# Patient Record
Sex: FEMALE | Race: WHITE | NOT HISPANIC OR LATINO | Employment: FULL TIME | RURAL
[De-identification: names, ages, dates, MRNs, and addresses within clinical notes are randomized per-mention and may not be internally consistent; named-entity substitution may affect disease eponyms.]

---

## 2022-01-01 ENCOUNTER — TELEMEDICINE (OUTPATIENT)
Dept: FAMILY MEDICINE CLINIC | Facility: CLINIC | Age: 57
End: 2022-01-01

## 2022-01-01 ENCOUNTER — OFFICE VISIT (OUTPATIENT)
Dept: GASTROENTEROLOGY | Facility: CLINIC | Age: 57
End: 2022-01-01

## 2022-01-01 ENCOUNTER — HOSPITAL ENCOUNTER (EMERGENCY)
Facility: HOSPITAL | Age: 57
End: 2022-11-03
Attending: EMERGENCY MEDICINE | Admitting: EMERGENCY MEDICINE

## 2022-01-01 ENCOUNTER — APPOINTMENT (OUTPATIENT)
Dept: GENERAL RADIOLOGY | Facility: HOSPITAL | Age: 57
End: 2022-01-01

## 2022-01-01 ENCOUNTER — TELEPHONE (OUTPATIENT)
Dept: FAMILY MEDICINE CLINIC | Facility: CLINIC | Age: 57
End: 2022-01-01

## 2022-01-01 VITALS
HEART RATE: 138 BPM | HEIGHT: 64 IN | RESPIRATION RATE: 12 BRPM | SYSTOLIC BLOOD PRESSURE: 134 MMHG | BODY MASS INDEX: 30.73 KG/M2 | WEIGHT: 180 LBS | DIASTOLIC BLOOD PRESSURE: 75 MMHG | OXYGEN SATURATION: 76 %

## 2022-01-01 DIAGNOSIS — R09.02 HYPOXIA: ICD-10-CM

## 2022-01-01 DIAGNOSIS — R09.2 RESPIRATORY ARREST: Primary | ICD-10-CM

## 2022-01-01 DIAGNOSIS — R19.5 LOOSE STOOLS: ICD-10-CM

## 2022-01-01 DIAGNOSIS — K21.9 GASTROESOPHAGEAL REFLUX DISEASE, UNSPECIFIED WHETHER ESOPHAGITIS PRESENT: Primary | ICD-10-CM

## 2022-01-01 DIAGNOSIS — R11.0 NAUSEA WITHOUT VOMITING: ICD-10-CM

## 2022-01-01 DIAGNOSIS — R11.0 NAUSEA: ICD-10-CM

## 2022-01-01 DIAGNOSIS — R19.7 DIARRHEA, UNSPECIFIED TYPE: Primary | ICD-10-CM

## 2022-01-01 LAB
BUN BLDA-MCNC: 27 MG/DL (ref 8–26)
CA-I BLDA-SCNC: 1 MMOL/L (ref 1.2–1.32)
CHLORIDE BLDA-SCNC: 100 MMOL/L (ref 98–109)
CO2 BLDA-SCNC: 15 MMOL/L (ref 24–29)
CREAT BLDA-MCNC: 1.3 MG/DL (ref 0.6–1.3)
EGFRCR SERPLBLD CKD-EPI 2021: 48.1 ML/MIN/1.73
GLUCOSE BLDC GLUCOMTR-MCNC: <20 MG/DL (ref 70–130)
HCT VFR BLDA CALC: 50 % (ref 38–51)
HGB BLDA-MCNC: 17 G/DL (ref 12–17)
POTASSIUM BLDA-SCNC: 4 MMOL/L (ref 3.5–4.9)
SODIUM BLD-SCNC: 136 MMOL/L (ref 138–146)

## 2022-01-01 PROCEDURE — 99284 EMERGENCY DEPT VISIT MOD MDM: CPT

## 2022-01-01 PROCEDURE — 85014 HEMATOCRIT: CPT

## 2022-01-01 PROCEDURE — 99213 OFFICE O/P EST LOW 20 MIN: CPT | Performed by: NURSE PRACTITIONER

## 2022-01-01 PROCEDURE — 25010000002 ATROPINE PER 0.01 MG

## 2022-01-01 PROCEDURE — 92950 HEART/LUNG RESUSCITATION CPR: CPT

## 2022-01-01 PROCEDURE — 80047 BASIC METABLC PNL IONIZED CA: CPT

## 2022-01-01 PROCEDURE — 31500 INSERT EMERGENCY AIRWAY: CPT

## 2022-01-01 PROCEDURE — 99214 OFFICE O/P EST MOD 30 MIN: CPT | Performed by: PHYSICIAN ASSISTANT

## 2022-01-01 PROCEDURE — 25010000002 EPINEPHRINE 1 MG/10ML SOLUTION PREFILLED SYRINGE: Performed by: EMERGENCY MEDICINE

## 2022-01-01 PROCEDURE — 36415 COLL VENOUS BLD VENIPUNCTURE: CPT

## 2022-01-01 PROCEDURE — 25010000002 ALTEPLASE PER 1 MG: Performed by: EMERGENCY MEDICINE

## 2022-01-01 PROCEDURE — 25010000002 AMIODARONE PER 30 MG: Performed by: EMERGENCY MEDICINE

## 2022-01-01 RX ORDER — LISINOPRIL 20 MG/1
20 TABLET ORAL DAILY
Qty: 30 TABLET | Refills: 0 | Status: SHIPPED | OUTPATIENT
Start: 2022-01-01

## 2022-01-01 RX ORDER — LISINOPRIL 20 MG/1
20 TABLET ORAL DAILY
Qty: 30 TABLET | Refills: 0 | Status: SHIPPED | OUTPATIENT
Start: 2022-01-01 | End: 2022-01-01

## 2022-01-01 RX ORDER — QUETIAPINE FUMARATE 25 MG/1
TABLET, FILM COATED ORAL
Qty: 60 TABLET | Refills: 2 | Status: SHIPPED | OUTPATIENT
Start: 2022-01-01

## 2022-01-01 RX ORDER — PANTOPRAZOLE SODIUM 40 MG/1
TABLET, DELAYED RELEASE ORAL
Qty: 30 TABLET | Refills: 1 | Status: SHIPPED | OUTPATIENT
Start: 2022-01-01 | End: 2022-01-01

## 2022-01-01 RX ORDER — PANTOPRAZOLE SODIUM 40 MG/1
TABLET, DELAYED RELEASE ORAL
Qty: 30 TABLET | Refills: 1 | Status: SHIPPED | OUTPATIENT
Start: 2022-01-01

## 2022-01-01 RX ORDER — LISINOPRIL 20 MG/1
20 TABLET ORAL DAILY
COMMUNITY
Start: 2022-01-01 | End: 2022-01-01

## 2022-01-01 RX ORDER — CITALOPRAM 40 MG/1
40 TABLET ORAL DAILY
Qty: 30 TABLET | Refills: 0 | Status: SHIPPED | OUTPATIENT
Start: 2022-01-01 | End: 2022-01-01

## 2022-01-01 RX ORDER — QUETIAPINE FUMARATE 25 MG/1
TABLET, FILM COATED ORAL
Qty: 60 TABLET | Refills: 2 | OUTPATIENT
Start: 2022-01-01

## 2022-01-01 RX ORDER — AMIODARONE HYDROCHLORIDE 50 MG/ML
INJECTION, SOLUTION INTRAVENOUS
Status: COMPLETED | OUTPATIENT
Start: 2022-01-01 | End: 2022-01-01

## 2022-01-01 RX ORDER — SODIUM CHLORIDE 0.9 % (FLUSH) 0.9 %
10 SYRINGE (ML) INJECTION AS NEEDED
Status: DISCONTINUED | OUTPATIENT
Start: 2022-01-01 | End: 2022-01-01

## 2022-01-01 RX ORDER — DEXTROSE MONOHYDRATE 25 G/50ML
INJECTION, SOLUTION INTRAVENOUS
Status: COMPLETED | OUTPATIENT
Start: 2022-01-01 | End: 2022-01-01

## 2022-01-01 RX ORDER — ATROPINE SULFATE 0.1 MG/ML
INJECTION INTRAVENOUS
Status: COMPLETED | OUTPATIENT
Start: 2022-01-01 | End: 2022-01-01

## 2022-01-01 RX ORDER — CITALOPRAM 40 MG/1
TABLET ORAL
Qty: 30 TABLET | Refills: 5 | Status: SHIPPED | OUTPATIENT
Start: 2022-01-01

## 2022-01-01 RX ORDER — ONDANSETRON HYDROCHLORIDE 8 MG/1
8 TABLET, FILM COATED ORAL EVERY 8 HOURS PRN
Qty: 30 TABLET | Refills: 2 | Status: SHIPPED | OUTPATIENT
Start: 2022-01-01

## 2022-01-01 RX ORDER — EPINEPHRINE 0.1 MG/ML
SYRINGE (ML) INJECTION
Status: COMPLETED | OUTPATIENT
Start: 2022-01-01 | End: 2022-01-01

## 2022-01-01 RX ADMIN — EPINEPHRINE 1 MG: 0.1 INJECTION INTRACARDIAC; INTRAVENOUS at 12:30

## 2022-01-01 RX ADMIN — EPINEPHRINE 1 MG: 0.1 INJECTION INTRACARDIAC; INTRAVENOUS at 12:12

## 2022-01-01 RX ADMIN — SODIUM BICARBONATE 50 MEQ: 84 INJECTION, SOLUTION INTRAVENOUS at 12:08

## 2022-01-01 RX ADMIN — AMIODARONE HYDROCHLORIDE 300 MG: 50 INJECTION, SOLUTION INTRAVENOUS at 12:19

## 2022-01-01 RX ADMIN — EPINEPHRINE 1 MG: 0.1 INJECTION INTRACARDIAC; INTRAVENOUS at 12:41

## 2022-01-01 RX ADMIN — EPINEPHRINE 1 MG: 0.1 INJECTION INTRACARDIAC; INTRAVENOUS at 12:08

## 2022-01-01 RX ADMIN — SODIUM BICARBONATE 50 MEQ: 84 INJECTION, SOLUTION INTRAVENOUS at 11:54

## 2022-01-01 RX ADMIN — DEXTROSE MONOHYDRATE 25 G: 25 INJECTION, SOLUTION INTRAVENOUS at 12:29

## 2022-01-01 RX ADMIN — EPINEPHRINE 1 MG: 0.1 INJECTION INTRACARDIAC; INTRAVENOUS at 12:05

## 2022-01-01 RX ADMIN — ALTEPLASE 100 MG: KIT at 12:30

## 2022-01-01 RX ADMIN — EPINEPHRINE 1 MG: 0.1 INJECTION INTRACARDIAC; INTRAVENOUS at 12:25

## 2022-01-01 RX ADMIN — EPINEPHRINE 1 MG: 0.1 INJECTION INTRACARDIAC; INTRAVENOUS at 11:52

## 2022-01-01 RX ADMIN — EPINEPHRINE 1 MG: 0.1 INJECTION INTRACARDIAC; INTRAVENOUS at 12:16

## 2022-01-01 RX ADMIN — EPINEPHRINE 1 MG: 0.1 INJECTION INTRACARDIAC; INTRAVENOUS at 12:37

## 2022-01-01 RX ADMIN — ATROPINE SULFATE 1 MG: 0.1 INJECTION INTRAVENOUS at 11:57

## 2022-05-11 NOTE — TELEPHONE ENCOUNTER
Gal ordered bw on 03/03/22, pt never had done. Last labs were 03/17/21. Protocol not med. Last appt was with CG on 03/11/22. Last RF was 03/11/22 30 w/ 1RF.

## 2022-06-22 NOTE — TELEPHONE ENCOUNTER
Incoming Refill Request      Medication requested (name and dose):   [facility]     Pharmacy where request should be sent: [facility]    Additional details provided by patient: PT CALLED TO SCHEDULE APPT. SHE STOPPED TAKING THIS MEDICATION WHEN SHE GOT SICK WITH COVID, WANTS TO RESTART TAKING MED D/T CRYING AND DEPRESSION EPISODES. WOULD LIKE TO HAVE ENOUGH RX TO GET HER THROUGH TO APPT SENT TO [facility].     Best call back number: [phone]    Does the patient have less than a 3 day supply:  [x] Yes  [] No    Sophia COLLADO Rep  06/22/22, 13:56 EDT

## 2022-07-05 NOTE — PROGRESS NOTES
Chief Complaint  Med Refill (zofran) and Diarrhea (W/ vomiting)  This was an audio and video enabled telemedicine encounter.  Subjective          Rubia Muniz presents to [facility]  Pt has had ongoing GI issues with nausea and diarrhea. She notices certain foods cause symptoms to worsen. She denies melena or abdominal pain. She takes Protonix as directed. She has used Zofran in the past.       Objective   Vital Signs:   There were no vitals taken for this visit.    There is no height or weight on file to calculate BMI.    Review of Systems   Constitutional: Negative for chills and fever.   Gastrointestinal: Positive for diarrhea, nausea and vomiting. Negative for abdominal pain.          Current Outpatient Medications:   •  citalopram (CeleXA) 40 MG tablet, Take 1 tablet by mouth Daily., Disp: 30 tablet, Rfl: 0  •  lisinopril (PRINIVIL,ZESTRIL) 20 MG tablet, Take 20 mg by mouth Daily., Disp: , Rfl:   •  pantoprazole (PROTONIX) 40 MG EC tablet, TAKE ONE TABLET BY MOUTH EVERY DAY, Disp: 30 tablet, Rfl: 1  •  QUEtiapine (SEROquel) 25 MG tablet, TAKE 1 OR 2 TABLETS BY MOUTH NIGHTLY AT BEDTIME AS NEEDED FOR SLEEP, Disp: 60 tablet, Rfl: 2  •  ondansetron (Zofran) 8 MG tablet, Take 1 tablet by mouth Every 8 (Eight) Hours As Needed for Nausea or Vomiting., Disp: 30 tablet, Rfl: 2      Allergies: Patient has no known allergies.    Physical Exam  Constitutional:       Appearance: Normal appearance.   Neurological:      Mental Status: She is alert.   Psychiatric:         Mood and Affect: Mood normal.         Behavior: Behavior normal.         Thought Content: Thought content normal.         Judgment: Judgment normal.          Result Review :                   Assessment and Plan    Diagnoses and all orders for this visit:    1. Diarrhea, unspecified type (Primary)  Comments:  Follow up with GI. [place] diet and increase fluids. Return for worsened sx.  Orders:  -     Ambulatory Referral to  Gastroenterology    2. Nausea  Comments:  Zofran as needed. [place] diet and increase fluids. Follow up with GI. Continue Protonix.   Orders:  -     ondansetron (Zofran) 8 MG tablet; Take 1 tablet by mouth Every 8 (Eight) Hours As Needed for Nausea or Vomiting.  Dispense: 30 tablet; Refill: 2                   Follow Up   No follow-ups on file.  Patient was given instructions and counseling regarding her condition or for health maintenance advice. Please see specific information pulled into the AVS if appropriate.     SOLOMON Esposito

## 2022-08-16 NOTE — PROGRESS NOTES
New Patient Consultation     Patient Name: Rubia Muniz  : 1965   MRN: [id number]     Chief Complaint:  No chief complaint on file.      History of Present Illness: Rubia Muniz is a 57 y.o. female, PMH includes GERD, HTN, anxiety, depression, who is here today for a Gastroenterology Consultation for diarrhea, nausea.     Pt reports chronic h/o intermittent GERD, nausea, loose stools dating back to childhood. She notes hamburger, tomato and spicy foods to be particularly aggravating. She generally has sx about once per week, and has taken pantoprazole intermittently with minimal relief. She does note that she works a physically strenuous job outside ([employer]) and states that she will frequently go all day without eating and note her sx are worse in evening when she gets home.     Patient denies associated fever, chills, vomiting, constipation, hematemesis, dysphagia, hematochezia, melena, bloating, weight loss or gain, dysuria, jaundice or bruising.    Patient denies personal or FHx of PUD, H Pylori, gastritis, pancreatitis, colitis, Celiac disease, UC, Crohn's disease, colon or gastric cancers. Maternal grandmother, mother, sister with IBS. Pt denies tobacco, illicit substance or NSAID use. Drinks chardonnay few times per week. She denies excessive caffeine use.     No previous EGD. CSY 2 years at [facility], reportedly normal.     Subjective      Review of Systems:   Review of Systems   Constitutional: Negative for appetite change, chills, diaphoresis, fatigue, fever, unexpected weight gain and unexpected weight loss.   HENT: Negative for drooling, facial swelling, mouth sores, rhinorrhea, sore throat, tinnitus, trouble swallowing and voice change.    Eyes: Negative.    Respiratory: Negative for cough, chest tightness and shortness of breath.    Cardiovascular: Negative for chest pain.   Gastrointestinal: Positive for diarrhea, nausea and GERD. Negative for abdominal pain, blood  in stool, constipation, vomiting and indigestion.   Genitourinary: Negative for dysuria, flank pain, hematuria and pelvic pain.   Musculoskeletal: Negative for arthralgias and myalgias.   Skin: Negative for color change, pallor and rash.   Neurological: Negative for dizziness, tremors, syncope, weakness and numbness.   Psychiatric/Behavioral: Negative for hallucinations and sleep disturbance. The patient is not nervous/anxious.    All other systems reviewed and are negative.      Past Medical History:   Past Medical History:   Diagnosis Date   • Depression        Past Surgical History: No past surgical history on file.    Family History:   Family History   Problem Relation Age of Onset   • Prostate cancer Father        Social History:   Social History     Socioeconomic History   • Marital status: Significant Other   Tobacco Use   • Smoking status: Former Smoker     Packs/day: 0.25     Years: 22.00     Pack years: 5.50     Start date:      Quit date:      Years since quittin.6   • Smokeless tobacco: Never Used       Alcohol/Tobacco History:   Social History     Substance and Sexual Activity   Alcohol Use None     Social History     Tobacco Use   Smoking Status Former Smoker   • Packs/day: 0.25   • Years: 22.00   • Pack years: 5.50   • Start date:    • Quit date:    • Years since quittin.6   Smokeless Tobacco Never Used       Medications:     Current Outpatient Medications:   •  citalopram (CeleXA) 40 MG tablet, TAKE ONE TABLET BY MOUTH EVERY DAY, Disp: 30 tablet, Rfl: 5  •  lisinopril (PRINIVIL,ZESTRIL) 20 MG tablet, Take 20 mg by mouth Daily., Disp: , Rfl:   •  ondansetron (Zofran) 8 MG tablet, Take 1 tablet by mouth Every 8 (Eight) Hours As Needed for Nausea or Vomiting., Disp: 30 tablet, Rfl: 2  •  pantoprazole (PROTONIX) 40 MG EC tablet, TAKE ONE TABLET BY MOUTH EVERY DAY, Disp: 30 tablet, Rfl: 1  •  QUEtiapine (SEROquel) 25 MG tablet, TAKE 1 OR 2 TABLETS BY MOUTH NIGHTLY AT BEDTIME AS  NEEDED FOR SLEEP, Disp: 60 tablet, Rfl: 2    Allergies:   No Known Allergies    Objective     Physical Exam:  Vital Signs: There were no vitals filed for this visit.  There is no height or weight on file to calculate BMI.     Physical Exam  Audio visit    Assessment / Plan      Assessment/Plan:   There are no diagnoses linked to this encounter.     You have chosen to receive care through a telephone visit. Do you consent to use a telephone visit for your medical care today? Yes    GERD  Nausea without vomiting  Loose stools   - continue pantoprazole 40mg daily   - pt given GERD diet instructions, advised to avoid GI irritants such as caffeine, carbonation, EtOH, tobacco, chocolate, peppermint, acid-based foods   - obtain CBC, CMP, CRP, celiac panel   - schedule for EGD   - follow up in clinic after completion of above studies   - call clinic at any time for questions or new / worsened sx    Follow Up:   Return in about 6 weeks (around 9/27/2022).    Plan of care reviewed with the patient at the conclusion of today's visit.  Education was provided regarding diagnosis, management, and any prescribed or recommended OTC medications.  Patient verbalized understanding of and agreement with management plan.     Time Statement:   Discussed plan of care in detail with patient today. Patient verbally understands and agrees. I have spent 45 minutes reviewing available diagnostics, obtaining history, examining the patient, developing a treatment plan, and educating the patient on disease process and plan of care.    Suzanne Marcial PA-C   [facility]

## 2022-10-20 NOTE — TELEPHONE ENCOUNTER
Sent 30 days of her lisinopril. She will need an appointment and blood work before anymore refills will be sent. thanks

## 2022-10-20 NOTE — TELEPHONE ENCOUNTER
HUB TO READ and SCHEDULE    Sent 30 days of her lisinopril. She will need an appointment and blood work before anymore refills will be sent. Thanks    Message left

## 2022-11-03 NOTE — ED PROVIDER NOTES
Subjective   History of Present Illness  This patient is a 57-year-old  female brought in from [facility] via EMS.  Patient was unable to provide a history, but the limited history we received indicated that the patient had a recent diagnosis of influenza that was having some shortness of breath.  Patient was evidently found to be unresponsive in a chair with no spontaneous breathing but without pulse.  EMS evidently provided rescue breathing and the patient woke up slightly but was noncommunicative.  Upon arrival here, patient was extremely mottled, had highly discolored and blue extremities, face neck and trunk.  She was in severe/critical status and mumbled and incoherent response to the only question asked of her.  She was unable to provide any history and an immediate decision was made to secure her airway and to move forward with RSI.  We immediately moved to bring these materials into the room, during which time, patient lost her pulse, became completely unresponsive, and CPR started.    Past medical history  Although HPI indicates a recent history of influenza, subsequent conversations with family after course of care indicated no formal diagnosis of influenza.  No formal history of pulmonary embolus, recent DVT or coronary disease.  Positive history of depression but no history of hypertension or dyslipidemia per report    Family history  Patient was unable to provide past medical history however after course of care, patient's mother indicated that there is a significant coronary history in the family        Review of Systems   Unable to perform ROS: Patient unresponsive       Past Medical History:   Diagnosis Date   • Depression        No Known Allergies    No past surgical history on file.    Family History   Problem Relation Age of Onset   • Prostate cancer Father        Social History     Socioeconomic History   • Marital status: Significant Other   Tobacco Use   • Smoking status: Former      Packs/day: 0.25     Years: 22.00     Pack years: 5.50     Types: Cigarettes     Start date:      Quit date: 2005     Years since quittin.8   • Smokeless tobacco: Never           Objective   Physical Exam  Vitals and nursing note reviewed.   Constitutional:       Appearance: She is ill-appearing and toxic-appearing.   HENT:      Head: Normocephalic and atraumatic.      Right Ear: External ear normal.      Left Ear: External ear normal.      Nose: Nose normal.      Mouth/Throat:      Mouth: Mucous membranes are dry.      Pharynx: Oropharynx is clear.      Comments: Large amount of secretions in the posterior oropharynx  Eyes:      General:         Right eye: No discharge.         Left eye: No discharge.   Cardiovascular:      Rate and Rhythm: Normal rate and regular rhythm.      Pulses: Normal pulses.      Comments: Patient with regular rate and rhythm as well as pulse upon initial evaluation.  This lasted only a few minutes before patient became pulseless  Pulmonary:      Effort: Tachypnea, accessory muscle usage and respiratory distress present.   Abdominal:      General: There is distension.   Musculoskeletal:      Right lower leg: No edema.      Left lower leg: No edema.   Skin:     Capillary Refill: Capillary refill takes more than 3 seconds.      Coloration: Skin is cyanotic, mottled and pale.   Neurological:      Mental Status: She is disoriented.      GCS: GCS eye subscore is 4. GCS verbal subscore is 1. GCS motor subscore is 4.      Comments: Patient initially GCS of 8-9 before losing pulse and becoming unresponsive.         Intubation    Date/Time: 11/3/2022 1:19 PM  Performed by: Moody Taylor MD  Authorized by: Moody Taylor MD     Consent:     Consent obtained:  Emergent situation    Consent given by:  Spouse    Alternatives discussed:  No treatment  Universal protocol:     Patient identity confirmed:  Anonymous protocol, patient vented/unresponsive  Pre-procedure details:      Indication: failure to oxygenate, failure to protect airway, failure to ventilate and predicted clinical deterioration      Patient status:  Unresponsive    Look externally: no concerns      Mouth opening - incisor distance:  3 or more finger widths    Mallampati score:  I    Obstruction: none      Neck mobility: normal      Pharmacologic strategy: none      Induction agents:  None    Paralytics:  None  Procedure details:     Preoxygenation:  Bag valve mask    CPR in progress: yes      Intubation method:  Oral    Intubation technique: video assisted      Laryngoscope blade:  Mac 3    Grade view: II      Tube size (mm):  8.0    Tube type:  Cuffed    Number of attempts:  2 (Transitioned initial 7.5 tube to 8.0 tube secondary to large amount of secretions and concern for air leak.)    Ventilation between attempts: yes with mask      Tube visualized through cords: yes    Placement assessment:     ETT at teeth/gumline (cm):  25    Tube secured with:  ETT waller    Breath sounds:  Equal    Placement verification: chest rise, colorimetric ETCO2, direct visualization and tube exhalation    Post-procedure details:     Procedure completion:  Tolerated    Complications comment:  None  Critical Care  Performed by: Moody Taylor MD  Authorized by: Moody Taylor MD     Critical care provider statement:     Critical care time (minutes):  60    Critical care start time:  11/3/2022 11:30 AM    Critical care end time:  11/3/2022 12:30 PM    Critical care was necessary to treat or prevent imminent or life-threatening deterioration of the following conditions:  Respiratory failure    Critical care was time spent personally by me on the following activities:  Development of treatment plan with patient or surrogate, discussions with consultants, evaluation of patient's response to treatment, examination of patient, obtaining history from patient or surrogate, pulse oximetry, re-evaluation of patient's condition, ordering and  "review of radiographic studies, ordering and review of laboratory studies, ordering and performing treatments and interventions and review of old charts    I assumed direction of critical care for this patient from another provider in my specialty: no      Care discussed with comment:                 ED Course  ED Course as of 22 1615   Thu 2022   1307 As soon as I was made aware that family was available here, immediately made myself available to her fernanda.  I ended up having 3 different conversations with her fibandar.  I let them know that I would seek to honor his tri, my patient and every interaction with her.  I kept him up to speed with the code, my thought process, the medications and therapies provided.  We talked about the intubation that was required, response to medications, and the ongoing CPR. [RUPESH]   1310 After approximately 45 to 50 minutes of active CPR, with multiple rounds of ACLS medications, a a minimum of 4 defibrillations, intubation, and continued chest compressions, a decision was ultimately made to cease continue therapy in light of the patient's rhythm moving to a consistent asystole on each pulse check. [RUPESH]   1311 I talked to the patient's fiancé at the time of this decision and right before the decision was made and he grabbed my hand and thanked me for taking good care of his fernandae, stating you all have done everything you can.\"  Time of death 12:44 PM Eastern time [RUPESH]   1311 After the aforementioned decision was made, the patient's sister arrived in the department, and was on the phone with the patient's mother, Elis.  I talked to them both and let them know about my deepest sympathy's for the loss of their family member, Ms. Muniz.  We talked through the clinical decision making, her care here, and the decisions made.  The patient's mother indicated that the patient has had severe chest pain and significant shortness of breath over the last couple of " days.  We talked about the potential for dysrhythmia, ACS, versus pulmonary embolus. [RUPESH]   1312  has been available and is with the family currently.  I let them know that I would be available for any conversations, questions or concerns and once again shared my condolences regarding the loss of their loved 1.  We had a chance to pray together I left them in the care of the . [RUPESH]      ED Course User Index  [RUPESH] Moody Taylor MD      Recent Results (from the past 24 hour(s))   POC CHEM 8    Collection Time: 11/03/22 12:27 PM    Specimen: Blood   Result Value Ref Range    Glucose <20 (C) 70 - 130 mg/dL    BUN 27 (H) 8 - 26 mg/dL    Creatinine 1.30 0.60 - 1.30 mg/dL    Sodium 136 (L) 138 - 146 mmol/L    POC Potassium 4.0 3.5 - 4.9 mmol/L    Chloride 100 98 - 109 mmol/L    Total CO2 15 (L) 24 - 29 mmol/L    Hemoglobin 17.0 12.0 - 17.0 g/dL    Hematocrit 50 38 - 51 %    Ionized Calcium 1.00 (L) 1.20 - 1.32 mmol/L    eGFR 48.1 (L) >60.0 mL/min/1.73     Note: In addition to lab results from this visit, the labs listed above may include labs taken at another facility or during a different encounter within the last 24 hours. Please correlate lab times with ED admission and discharge times for further clarification of the services performed during this visit.    No orders to display     Vitals:    11/03/22 1149 11/03/22 1152 11/03/22 1157 11/03/22 1201   BP:   134/75    Pulse: 120 (!) 0 83 (!) 138   Resp:       SpO2:    (!) 76%   Weight:       Height:         Medications   EPINEPHrine (ADRENALIN) injection (1 mg Intravenous Given 11/3/22 1241)   sodium bicarbonate injection 8.4% (50 mEq Intravenous Given 11/3/22 1208)   Atropine Sulfate injection (1 mg Intravenous Given 11/3/22 1157)   amiodarone (CORDARONE) injection (300 mg Intravenous Given 11/3/22 1219)   dextrose (D50W) (25 g/50 mL) IV injection (25 g Intravenous Given 11/3/22 1229)     ECG/EMG Results (last 24 hours)     ** No results found for  the last 24 hours. **        No orders to display                                              MDM    Final diagnoses:   Respiratory arrest (HCC)   Hypoxia       ED Disposition  ED Disposition     ED Disposition       Condition   --    Comment   --             No follow-up provider specified.       Medication List      No changes were made to your prescriptions during this visit.          Moody Taylor MD  22 [id number]